# Patient Record
Sex: FEMALE | Race: WHITE | NOT HISPANIC OR LATINO
[De-identification: names, ages, dates, MRNs, and addresses within clinical notes are randomized per-mention and may not be internally consistent; named-entity substitution may affect disease eponyms.]

---

## 2024-07-30 PROBLEM — U07.1 COVID: Status: ACTIVE | Noted: 2024-07-30

## 2024-08-12 ENCOUNTER — APPOINTMENT (OUTPATIENT)
Dept: INTERNAL MEDICINE | Facility: CLINIC | Age: 89
End: 2024-08-12

## 2024-08-13 ENCOUNTER — EMERGENCY (EMERGENCY)
Facility: HOSPITAL | Age: 89
LOS: 1 days | Discharge: HOME | End: 2024-08-13
Attending: EMERGENCY MEDICINE | Admitting: EMERGENCY MEDICINE
Payer: MEDICARE

## 2024-08-13 VITALS
WEIGHT: 177.91 LBS | HEART RATE: 67 BPM | DIASTOLIC BLOOD PRESSURE: 74 MMHG | TEMPERATURE: 98 F | HEIGHT: 65 IN | OXYGEN SATURATION: 95 % | SYSTOLIC BLOOD PRESSURE: 153 MMHG | RESPIRATION RATE: 18 BRPM

## 2024-08-13 VITALS
OXYGEN SATURATION: 98 % | TEMPERATURE: 97 F | SYSTOLIC BLOOD PRESSURE: 146 MMHG | HEART RATE: 65 BPM | DIASTOLIC BLOOD PRESSURE: 70 MMHG | RESPIRATION RATE: 18 BRPM

## 2024-08-13 DIAGNOSIS — E86.0 DEHYDRATION: ICD-10-CM

## 2024-08-13 DIAGNOSIS — R55 SYNCOPE AND COLLAPSE: ICD-10-CM

## 2024-08-13 DIAGNOSIS — E78.5 HYPERLIPIDEMIA, UNSPECIFIED: ICD-10-CM

## 2024-08-13 DIAGNOSIS — Z87.440 PERSONAL HISTORY OF URINARY (TRACT) INFECTIONS: ICD-10-CM

## 2024-08-13 DIAGNOSIS — Z86.73 PERSONAL HISTORY OF TRANSIENT ISCHEMIC ATTACK (TIA), AND CEREBRAL INFARCTION WITHOUT RESIDUAL DEFICITS: ICD-10-CM

## 2024-08-13 LAB
ANION GAP SERPL CALC-SCNC: 11 MMOL/L — SIGNIFICANT CHANGE UP (ref 5–17)
APPEARANCE UR: CLEAR — SIGNIFICANT CHANGE UP
APTT BLD: 23.7 SEC — LOW (ref 24.5–35.6)
BACTERIA # UR AUTO: ABNORMAL /HPF
BILIRUB UR-MCNC: NEGATIVE — SIGNIFICANT CHANGE UP
BUN SERPL-MCNC: 22 MG/DL — SIGNIFICANT CHANGE UP (ref 7–23)
CALCIUM SERPL-MCNC: 9.4 MG/DL — SIGNIFICANT CHANGE UP (ref 8.4–10.5)
CAST: 0 /LPF — SIGNIFICANT CHANGE UP (ref 0–4)
CHLORIDE SERPL-SCNC: 104 MMOL/L — SIGNIFICANT CHANGE UP (ref 96–108)
CO2 SERPL-SCNC: 25 MMOL/L — SIGNIFICANT CHANGE UP (ref 22–31)
COLOR SPEC: YELLOW — SIGNIFICANT CHANGE UP
CREAT SERPL-MCNC: 1.29 MG/DL — SIGNIFICANT CHANGE UP (ref 0.5–1.3)
DIFF PNL FLD: NEGATIVE — SIGNIFICANT CHANGE UP
EGFR: 39 ML/MIN/1.73M2 — LOW
GLUCOSE SERPL-MCNC: 201 MG/DL — HIGH (ref 70–99)
GLUCOSE UR QL: NEGATIVE MG/DL — SIGNIFICANT CHANGE UP
HCT VFR BLD CALC: 40.4 % — SIGNIFICANT CHANGE UP (ref 34.5–45)
HGB BLD-MCNC: 13.1 G/DL — SIGNIFICANT CHANGE UP (ref 11.5–15.5)
INR BLD: 0.97 — SIGNIFICANT CHANGE UP (ref 0.85–1.18)
KETONES UR-MCNC: NEGATIVE MG/DL — SIGNIFICANT CHANGE UP
LEUKOCYTE ESTERASE UR-ACNC: ABNORMAL
MCHC RBC-ENTMCNC: 29.9 PG — SIGNIFICANT CHANGE UP (ref 27–34)
MCHC RBC-ENTMCNC: 32.4 GM/DL — SIGNIFICANT CHANGE UP (ref 32–36)
MCV RBC AUTO: 92.2 FL — SIGNIFICANT CHANGE UP (ref 80–100)
NITRITE UR-MCNC: NEGATIVE — SIGNIFICANT CHANGE UP
NRBC # BLD: 0 /100 WBCS — SIGNIFICANT CHANGE UP (ref 0–0)
PH UR: 6 — SIGNIFICANT CHANGE UP (ref 5–8)
PLATELET # BLD AUTO: 221 K/UL — SIGNIFICANT CHANGE UP (ref 150–400)
POTASSIUM SERPL-MCNC: 4.3 MMOL/L — SIGNIFICANT CHANGE UP (ref 3.5–5.3)
POTASSIUM SERPL-SCNC: 4.3 MMOL/L — SIGNIFICANT CHANGE UP (ref 3.5–5.3)
PROT UR-MCNC: SIGNIFICANT CHANGE UP MG/DL
PROTHROM AB SERPL-ACNC: 11.1 SEC — SIGNIFICANT CHANGE UP (ref 9.5–13)
RBC # BLD: 4.38 M/UL — SIGNIFICANT CHANGE UP (ref 3.8–5.2)
RBC # FLD: 12.7 % — SIGNIFICANT CHANGE UP (ref 10.3–14.5)
RBC CASTS # UR COMP ASSIST: 2 /HPF — SIGNIFICANT CHANGE UP (ref 0–4)
SODIUM SERPL-SCNC: 140 MMOL/L — SIGNIFICANT CHANGE UP (ref 135–145)
SP GR SPEC: 1.02 — SIGNIFICANT CHANGE UP (ref 1–1.03)
SQUAMOUS # UR AUTO: 5 /HPF — SIGNIFICANT CHANGE UP (ref 0–5)
TRANS CELLS #/AREA URNS HPF: PRESENT
TROPONIN T, HIGH SENSITIVITY RESULT: 17 NG/L — SIGNIFICANT CHANGE UP (ref 0–51)
TROPONIN T, HIGH SENSITIVITY RESULT: 19 NG/L — SIGNIFICANT CHANGE UP (ref 0–51)
UROBILINOGEN FLD QL: 0.2 MG/DL — SIGNIFICANT CHANGE UP (ref 0.2–1)
WBC # BLD: 8.09 K/UL — SIGNIFICANT CHANGE UP (ref 3.8–10.5)
WBC # FLD AUTO: 8.09 K/UL — SIGNIFICANT CHANGE UP (ref 3.8–10.5)
WBC UR QL: 1 /HPF — SIGNIFICANT CHANGE UP (ref 0–5)

## 2024-08-13 PROCEDURE — 80048 BASIC METABOLIC PNL TOTAL CA: CPT

## 2024-08-13 PROCEDURE — 71045 X-RAY EXAM CHEST 1 VIEW: CPT | Mod: 26

## 2024-08-13 PROCEDURE — 93005 ELECTROCARDIOGRAM TRACING: CPT

## 2024-08-13 PROCEDURE — 82962 GLUCOSE BLOOD TEST: CPT

## 2024-08-13 PROCEDURE — 85730 THROMBOPLASTIN TIME PARTIAL: CPT

## 2024-08-13 PROCEDURE — 70450 CT HEAD/BRAIN W/O DYE: CPT | Mod: MC

## 2024-08-13 PROCEDURE — 81001 URINALYSIS AUTO W/SCOPE: CPT

## 2024-08-13 PROCEDURE — 93010 ELECTROCARDIOGRAM REPORT: CPT

## 2024-08-13 PROCEDURE — 70450 CT HEAD/BRAIN W/O DYE: CPT | Mod: 26,MC

## 2024-08-13 PROCEDURE — 85610 PROTHROMBIN TIME: CPT

## 2024-08-13 PROCEDURE — 84484 ASSAY OF TROPONIN QUANT: CPT

## 2024-08-13 PROCEDURE — 99285 EMERGENCY DEPT VISIT HI MDM: CPT | Mod: 25

## 2024-08-13 PROCEDURE — 85027 COMPLETE CBC AUTOMATED: CPT

## 2024-08-13 PROCEDURE — 99285 EMERGENCY DEPT VISIT HI MDM: CPT

## 2024-08-13 PROCEDURE — 36415 COLL VENOUS BLD VENIPUNCTURE: CPT

## 2024-08-13 PROCEDURE — 71045 X-RAY EXAM CHEST 1 VIEW: CPT

## 2024-08-13 RX ORDER — BACTERIOSTATIC SODIUM CHLORIDE 0.9 %
500 VIAL (ML) INJECTION ONCE
Refills: 0 | Status: COMPLETED | OUTPATIENT
Start: 2024-08-13 | End: 2024-08-13

## 2024-08-13 RX ADMIN — Medication 500 MILLILITER(S): at 10:42

## 2024-08-13 NOTE — ED ADULT NURSE NOTE - CHIEF COMPLAINT QUOTE
Pt presents to ED BIBA from home s/p syncopal episode this morning while using the bathroom. Denies head injury or blood thinner. Pt A&Ox4, NAD and conversive in full sentences. Recently diagnosed with COVID 2 weeks ago and UTI on doxy. FS and EKG in prog. Pt received 250cc of NS w/ 20G L AC.

## 2024-08-13 NOTE — ED PROVIDER NOTE - NSFOLLOWUPINSTRUCTIONS_ED_ALL_ED_FT
1. You were seen for syncope and dehydration. A copy of any of your resulted labs, imaging, and findings have been provided to you. Make sure to view any test results that may not have yet resulted at the time of your discharge by creating a FollowMyHealth account at: [email or web address] to sign up for FollowMyHealth.   2. Continue to take your home medications as prescribed.   3. Please follow up with your primary care physician. You may call our referrals coordinator at [phone] Monday to Friday 11am-7pm for assistance with making an appointment. Or you can call [phone] to make an appointment.  4. Return to the emergency department for new, persistent, or worsening symptoms or signs, or for any concerning symptoms.   5. For your for health, you should make healthy food choices and be physically active. Also, you should not smoke or use drugs, and you should not drink alcohol in excess. Please visit [email or web address] for resources and more information.    Syncope    Syncope is when you temporarily lose consciousness, also called fainting or passing out. It is caused by a sudden decrease in blood flow to the brain. Even though most causes of syncope are not dangerous, syncope can possibly be a sign of a serious medical problem. Signs that you may be about to faint include feeling dizzy, lightheaded, nausea, visual changes, or cold/clammy skin. Do not drive, operate heavy machinery, or play sports until your health care provider says it is okay.    SEEK IMMEDIATE MEDICAL CARE IF YOU HAVE ANY OF THE FOLLOWING SYMPTOMS: severe headache, pain in your chest/abdomen/back, bleeding from your mouth or rectum, palpitations, shortness of breath, pain with breathing, seizure, confusion, or trouble walking.    Dehydration    Dehydration is when there is not enough fluid or water in your body. This happens when you lose more fluids than you take in. People who are age 65 or older have a higher risk of getting dehydrated.    Dehydration can range from mild to very bad. It should be treated right away to keep it from getting very bad.     Symptoms of mild dehydration may include:    Thirst.  Dry lips.  Slightly dry mouth.  Dry, warm skin.  Dizziness.    Symptoms of moderate dehydration may include:    Very dry mouth.  Muscle cramps.  Dark pee (urine). Pee may be the color of tea.  Your body making less pee.  Your eyes making fewer tears.  Heartbeat that is uneven or faster than normal (palpitations).  Headache.  Light-headedness, especially when you stand up from sitting.  Fainting (syncope).    Symptoms of very bad dehydration may include:    Changes in skin, such as:  Cold and clammy skin.  Blotchy (mottled) or pale skin.  Skin that does not quickly return to normal after being lightly pinched and let go (poor skin turgor).  Changes in body fluids, such as:  Feeling very thirsty.  Your eyes making fewer tears.  Not sweating when body temperature is high, such as in hot weather.  Your body making very little pee.  Changes in vital signs, such as:  Weak pulse.  Pulse that is more than 100 beats a minute when you are sitting still.  Fast breathing.  Low blood pressure.  Other changes, such as:  Sunken eyes.  Cold hands and feet.  Confusion.  Lack of energy (lethargy).  Trouble waking up from sleep.  Short-term weight loss.  Unconsciousness.    Follow these instructions at home:  If told by your doctor, drink an ORS:  Make an ORS by using instructions on the package.  Start by drinking small amounts, about ½ cup (120 mL) every 5–10 minutes.  Slowly drink more until you have had the amount that your doctor said to have.  Drink enough clear fluid to keep your pee clear or pale yellow. If you were told to drink an ORS, finish the ORS first, then start slowly drinking clear fluids. Drink fluids such as:  Water. Do not drink only water by itself. Doing that can make the salt (sodium) level in your body get too low (hyponatremia).  Ice chips.  Fruit juice that you have added water to (diluted).  Low-calorie sports drinks.  Avoid:  Alcohol.  Drinks that have a lot of sugar. These include high-calorie sports drinks, fruit juice that does not have water added, and soda.  Caffeine.  Foods that are greasy or have a lot of fat or sugar.  Take over-the-counter and prescription medicines only as told by your doctor.  Do not take salt tablets. Doing that can make the salt level in your body get too high (hypernatremia).  Eat foods that have minerals (electrolytes). Examples include bananas, oranges, potatoes, tomatoes, and spinach.  Keep all follow-up visits as told by your doctor. This is important.     Contact a doctor if:  You have belly (abdominal) pain that:  Gets worse.  Stays in one area (localizes).  You have a rash.  You have a stiff neck.  You get angry or annoyed more easily than normal (irritability).  You are more sleepy than normal.  You have a harder time waking up than normal.  You feel:  Weak.  Dizzy.  Very thirsty.    Get help right away if:  You have symptoms of very bad dehydration.  You cannot drink fluids without throwing up (vomiting).  Your symptoms get worse with treatment.  You have a fever.  You have a very bad headache.  You are throwing up or having watery poop (diarrhea) and it:  Gets worse.  Does not go away.  You have diarrhea for more than 24 hours.  You have blood or something green (bile) in your throw-up.  You have blood in your poop (stool). This may cause poop to look black and tarry.  You have not peed in 6–8 hours.  You have peed (urinated) only a small amount of very dark pee during 6–8 hours.  You pass out (faint).  Your heart rate when you are sitting still is more than 100 beats a minute.  You have trouble breathing.    ADDITIONAL NOTES AND INSTRUCTIONS    Please follow up with your Primary MD in 24-48 hr.  Seek immediate medical care for any new/worsening signs or symptoms.

## 2024-08-13 NOTE — ED PROVIDER NOTE - OBJECTIVE STATEMENT
89F here with grandson and HHA with PMHx of malignant melanoma (removed), HLD, TIA, NPH (previously rec'd shunt in the past, ultimately declined) c/b progressive gait instability, memory issues, and urinary incontinence, recent covid infection 2 weeks ago with residual fatigue, recent UTI (currently on cephalexin) who p/w syncope. Pt was on the toilet after a BM and per aid her face turn white and she lost consciousness for about 1 minute, aid caught her in her arms, no trauma or fall, no seizure activity, no vomits, no f/c, some decreased PO intake since covid infection, no f/c, no cp/sob. Pt was given 250cc by EMS en route and per grandson now looking better/back to baseline. 89F here with grandson and HHA with PMHx of malignant melanoma (removed), HLD, TIA, NPH (previously rec'd shunt in the past, ultimately declined) c/b progressive gait instability, memory issues, and urinary incontinence, recent covid infection 2 weeks ago with residual fatigue, recent UTI (currently on cephalexin) who p/w syncope. Pt was on the toilet after a BM and per aid her face turn white and she lost consciousness for about 1 minute, aid caught her in her arms, no trauma or fall, no seizure activity, no vomits, no f/c, some decreased PO intake since covid infection, no f/c, no cp/sob. Per aid pt has been less ambulatory lately, spending most of her time sleeping in bed, but can transfer to chair or toilet with assistance. Pt was given 250cc by EMS en route and per grandson now looking better/back to baseline.

## 2024-08-13 NOTE — ED ADULT TRIAGE NOTE - CHIEF COMPLAINT QUOTE
Pt presents to ED BIBA from home s/p syncopal episode this morning while using the bathroom. Denies head injury or blood thinner. Pt A&Px4, NAD and conversive in full sentences. Recently diagnosed with COVID 2 weeks ago and UTI on doxy. FS and EKG in prog. Pt presents to ED BIBA from home s/p syncopal episode this morning while using the bathroom. Denies head injury or blood thinner. Pt A&Px4, NAD and conversive in full sentences. Recently diagnosed with COVID 2 weeks ago and UTI on doxy. FS and EKG in prog. Pt received 250cc of NS w/ 20G L AC. Pt presents to ED BIBA from home s/p syncopal episode this morning while using the bathroom. Denies head injury or blood thinner. Pt A&Ox4, NAD and conversive in full sentences. Recently diagnosed with COVID 2 weeks ago and UTI on doxy. FS and EKG in prog. Pt received 250cc of NS w/ 20G L AC.

## 2024-08-13 NOTE — ED PROVIDER NOTE - PHYSICAL EXAMINATION
GEN: Elderly, well developed, awake, alert, oriented to person, place, time and in no apparent distress. NTAF  ENT: Airway patent, Nasal mucosa clear. Mouth with somewhat dry mucosa.  EYES: Clear bilaterally. PERRL, EOMI  RESPIRATORY: Breathing comfortably with normal RR. No W/C/R, no hypoxia or resp distress.  CARDIAC: Regular rate and rhythm, no M/R/G  ABDOMEN: Soft, nontender, +bowel sounds, no rebound, rigidity, or guarding.  MSK: Range of motion is not limited, no deformities noted.  NEURO: Alert and oriented x 3. Cn 2-12 intact. Generalized weakness without focal neuro deficits.   SKIN: Skin normal color for race, warm, dry and intact. No evidence of rash.  PSYCH: Alert and oriented to person, place, time, normal mood and affect. no apparent risk to self or others.

## 2024-08-13 NOTE — ED ADULT NURSE NOTE - NSFALLHARMRISKINTERV_ED_ALL_ED

## 2024-08-13 NOTE — ED PROVIDER NOTE - CLINICAL SUMMARY MEDICAL DECISION MAKING FREE TEXT BOX
89F here with grandson and HHA with PMHx of malignant melanoma (removed), HLD, TIA, NPH (previously rec'd shunt in the past, ultimately declined) c/b progressive gait instability, memory issues, and urinary incontinence, recent covid infection 2 weeks ago with residual fatigue, recent UTI (currently on cephalexin) who p/w syncope. Pt was on the toilet after a BM and per aid her face turn white and she lost consciousness for about 1 minute, aid caught her in her arms, no trauma or fall, no seizure activity, no vomits, no f/c, some decreased PO intake since covid infection, no f/c, no cp/sob. Per aid pt has been less ambulatory lately, spending most of her time sleeping in bed, but can transfer to chair or toilet with assistance. Pt was given 250cc by EMS en route and per grandson now looking better/back to baseline.  on exam, VSS, a&o x 3 with some confusion (baseline per family), appears dehydrated, no trauma.   Orthostatics performed and borderline positive.   EKG no ischemia, plan for labs including trop x 2, CT head, CXR, UA, r/o occult infection, r/o ICH, r/o ami.   Gentle IVFs.   Pt feels much better after IVFs, back to baseline per family. labs c/w mild dehydration. Trop stable x 2.  Do not suspect ACS, PE, dissection or other acute life-threatening pathology at this time.  CT head shows known hydrocephalus, likely contributing to patient's progressive decline.   Pt and family continue to refuse shunt so no indication for NS consult.   Family and patient requesting DC home. They plan to f/u with Dr. Cristobal as oupt.   Stable for DC with return precautions.

## 2024-08-13 NOTE — ED ADULT NURSE NOTE - NSSEPSISSUSPECTED_ED_A_ED
Deandre with [facility] reports patient started having respiratory symptoms on Monday. Respiratory illness is going through facility. Patient has cough that is productive at times along with congestion. Patient does not have fever and vitals stable.     Facility is asking for orders for RSV influenza A and B and Strep swab and chest xray.   No

## 2024-08-13 NOTE — ED ADULT NURSE NOTE - OBJECTIVE STATEMENT
Pt. a&ox4 forgetful @ baseline, ambulatory, hx of HTN, HLD, DM2, comes to ED s/p syncopal episode while having a BM on the toilet this morning. Witnessed by HHA @ bedside, LOC lasted approx 2 mins, no injury, headstrike, or fall occurred as HHA was able to hold her upright sitting, no AC use. Denies this ever occurring for pt ; on EMS arrival (@ bedside, is pt's grandson), pt. was diaphoretic and "out of it" with BP 80s/60s. Pt. was given 400cc IVF and on arrival, pt's BP / VS and alertness has improved back to her baseline. Pt. is finishing a course of PO abx for UTI, and was dxed with covid 2 weeks ago. HHA states pt has been weaker since having covid. ekg and bgl done.

## 2024-08-13 NOTE — ED PROVIDER NOTE - PATIENT PORTAL LINK FT
You can access the FollowMyHealth Patient Portal offered by [facility] by registering at the following website: [email or web address] By joining [employer]’s FollowMyHealth portal, you will also be able to view your health information using other applications (apps) compatible with our system.